# Patient Record
Sex: FEMALE | Race: WHITE
[De-identification: names, ages, dates, MRNs, and addresses within clinical notes are randomized per-mention and may not be internally consistent; named-entity substitution may affect disease eponyms.]

---

## 2018-07-13 ENCOUNTER — HOSPITAL ENCOUNTER (OUTPATIENT)
Dept: HOSPITAL 84 - D.MAMMO | Age: 63
Discharge: HOME | End: 2018-07-13
Attending: CLINICAL NURSE SPECIALIST
Payer: COMMERCIAL

## 2018-07-13 VITALS — BODY MASS INDEX: 43.7 KG/M2

## 2018-07-13 DIAGNOSIS — Z12.31: Primary | ICD-10-CM

## 2021-03-15 ENCOUNTER — HOSPITAL ENCOUNTER (OUTPATIENT)
Dept: HOSPITAL 84 - D.OPS | Age: 66
Discharge: HOME | End: 2021-03-15
Attending: INTERNAL MEDICINE
Payer: MEDICARE

## 2021-03-15 VITALS
HEIGHT: 64 IN | WEIGHT: 293 LBS | WEIGHT: 293 LBS | HEIGHT: 64 IN | BODY MASS INDEX: 50.02 KG/M2 | BODY MASS INDEX: 50.02 KG/M2

## 2021-03-15 VITALS — DIASTOLIC BLOOD PRESSURE: 73 MMHG | SYSTOLIC BLOOD PRESSURE: 150 MMHG

## 2021-03-15 DIAGNOSIS — K64.0: ICD-10-CM

## 2021-03-15 DIAGNOSIS — K63.5: ICD-10-CM

## 2021-03-15 DIAGNOSIS — Z12.11: Primary | ICD-10-CM

## 2021-03-15 LAB
ERYTHROCYTE [DISTWIDTH] IN BLOOD BY AUTOMATED COUNT: 14.2 % (ref 11.5–14.5)
HCT VFR BLD CALC: 41.7 % (ref 36–48)
HGB BLD-MCNC: 14 G/DL (ref 12–16)
MCH RBC QN AUTO: 31.9 PG (ref 26–34)
MCHC RBC AUTO-ENTMCNC: 33.6 G/DL (ref 31–37)
MCV RBC: 95 FL (ref 80–100)
PLATELET # BLD: 193 10X3/UL (ref 130–400)
PMV BLD AUTO: 9.3 FL (ref 7.4–10.4)
RBC # BLD AUTO: 4.39 10X6/UL (ref 4–5.4)
WBC # BLD AUTO: 7.4 10X3/UL (ref 4.8–10.8)

## 2021-03-15 NOTE — OP
PATIENT NAME:  KELIN VELEZ                             MEDICAL RECORD: [id number]
:53                                             LOCATION:[facility]          
                                                         ADMISSION DATE:        
SURGEON:  MIGUEL ANGEL HEATH DO             
 
 
DATE OF OPERATION:  03/15/2021
 
PROCEDURE:  Colonoscopy with polypectomy.
 
INDICATION FOR THE PROCEDURE:  Screening for colorectal cancer.
 
SCOPE:  Olympus video pediatric colonoscope.
 
MEDICATIONS:  Propofol 360 mg IV per anesthesia.
 
WITHDRAWAL TIME:  14 minutes.
 
ESTIMATED BLOOD LOSS:  Minimal.
 
COMPLICATIONS:  None.
 
FINDINGS:  Informed consent was given.  The patient was made comfortable with
the above medication.  After reaching an adequate level of sedation by slow IV
push, the patient was placed on her left side.  A digital rectal examination was
performed and it was normal.  The endoscope was advanced under direct
visualization through the rectum to the cecum, confirmed by the presence of the
appendiceal orifice and ileocecal valve.  The endoscope was slowly withdrawn. 
Mucosa was carefully examined.  The prep quality was good.  There were 6 polyps
visualized on today's examination in total.  Two were located in the rectum. 
They were benign appearing and sessile and ranged in size from 2-3 mm in
diameter.  They were both removed using hot forceps.  In the transverse colon,
there were two separate benign-appearing polyps, which ranged in size from 4
mm-7 mm in diameter.  They were both removed using a hot snare.  In the cecum,
there was a benign-appearing sessile polyp, which measured approximately 2 mm in
diameter that was removed using a hot forceps.  In the ascending colon, there
was a benign-appearing sessile polyp, which measured approximately 4 mm in
diameter, which was removed using a hot snare.  There were no diverticula
visualized on today's examination.  There were grade I internal hemorrhoids
visualized upon retroflexion within the rectum.  The endoscope was withdrawn
from the patient.  The patient tolerated the procedure well and there were no
complications.
 
IMPRESSION:
1.  Grade I internal hemorrhoids without bleeding.
2.  Multiple polyps as described above, removed using a combination of a hot
snare and hot forceps.
 
PLAN AND RECOMMENDATIONS:
1.  Discharge home when recovery parameters are met.
2.  Follow up biopsy specimen results.
3.  High fiber diet.
4.  Continue current medications.
5.  Recall colonoscopy in 3 years.
 
TRANSINT:[id number] Voice Confirmation ID: [id number] DOCUMENT ID: [id number]
 
 
 
OPERATIVE REPORT                               [id number]    VELEZ,KELINMIGUEL ANGEL QUINTANILLA DO             
 
 
 
Electronically Signed by MIGUEL ANGEL OLMSTEAD on 03/15/21 at 1716
 
 
 
 
 
 
 
 
 
 
 
 
 
 
 
 
 
 
 
 
 
 
 
 
 
 
 
 
 
 
 
 
 
 
 
 
 
 
 
 
 
CC:                                                             [id number]
DICTATION DATE: 03/15/21 1405     :     03/15/21 1654      [facility] 
                                                                      03/15/21
[facility]                                          
[address] [id number]